# Patient Record
Sex: FEMALE | Race: ASIAN | NOT HISPANIC OR LATINO | URBAN - METROPOLITAN AREA
[De-identification: names, ages, dates, MRNs, and addresses within clinical notes are randomized per-mention and may not be internally consistent; named-entity substitution may affect disease eponyms.]

---

## 2018-01-06 ENCOUNTER — EMERGENCY (EMERGENCY)
Facility: HOSPITAL | Age: 53
LOS: 1 days | Discharge: HOME | End: 2018-01-06
Admitting: EMERGENCY MEDICINE
Payer: COMMERCIAL

## 2018-01-06 VITALS
WEIGHT: 147.93 LBS | OXYGEN SATURATION: 98 % | SYSTOLIC BLOOD PRESSURE: 128 MMHG | DIASTOLIC BLOOD PRESSURE: 72 MMHG | HEART RATE: 100 BPM | RESPIRATION RATE: 18 BRPM | TEMPERATURE: 97 F
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
## 2018-01-06 DIAGNOSIS — Z23 ENCOUNTER FOR IMMUNIZATION: ICD-10-CM

## 2018-01-06 DIAGNOSIS — S61.412A LACERATION WITHOUT FOREIGN BODY OF LEFT HAND, INITIAL ENCOUNTER: ICD-10-CM
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
## 2018-01-06 PROCEDURE — 90715 TDAP VACCINE 7 YRS/> IM: CPT

## 2018-01-06 PROCEDURE — 90471 IMMUNIZATION ADMIN: CPT

## 2018-01-06 PROCEDURE — 12001 RPR S/N/AX/GEN/TRNK 2.5CM/<: CPT

## 2018-01-06 PROCEDURE — 99283 EMERGENCY DEPT VISIT LOW MDM: CPT | Mod: 25

## 2018-01-06 PROCEDURE — 99284 EMERGENCY DEPT VISIT MOD MDM: CPT | Mod: 25

## 2018-01-06 RX ORDER — TETANUS TOXOID, REDUCED DIPHTHERIA TOXOID AND ACELLULAR PERTUSSIS VACCINE, ADSORBED 5; 2.5; 8; 8; 2.5 [IU]/.5ML; [IU]/.5ML; UG/.5ML; UG/.5ML; UG/.5ML
0.5 SUSPENSION INTRAMUSCULAR ONCE
Qty: 0 | Refills: 0 | Status: COMPLETED | OUTPATIENT
Start: 2018-01-06 | End: 2018-01-06

## 2018-01-06 RX ADMIN — TETANUS TOXOID, REDUCED DIPHTHERIA TOXOID AND ACELLULAR PERTUSSIS VACCINE, ADSORBED 0.5 MILLILITER(S): 5; 2.5; 8; 8; 2.5 SUSPENSION INTRAMUSCULAR at 16:05

## 2018-01-06 NOTE — ED PROVIDER NOTE - MEDICAL DECISION MAKING DETAILS
51 y/o f presents with laceration at base of left 1st phalanx; pt with numbness to that side of finger, likely nerve injury, has FROM, no tendon involvement.  Discussed case with hand surgery Dr. Diaz, will close in ED, f/u in office this week for further evaluation.  Tetanus updated in ED.

## 2018-01-06 NOTE — ED PROVIDER NOTE - MUSCULOSKELETAL, MLM
laceration at base of 1st phalanx of left hand in webspace between 1st and 2nd digits, +FROM 1st phalanx, decreased sensation to medial aspect

## 2018-01-06 NOTE — ED ADULT NURSE NOTE - OBJECTIVE STATEMENT
+lac noted to right hand between thumb and index finger.  states she cut herself with knife while cooking.  bleeding controlled with pressure at this time. denies numbness/tingling. able to move all fingers

## 2018-01-06 NOTE — ED PROVIDER NOTE - OBJECTIVE STATEMENT
53 y/o f presents stating she cut her left hand at base of her thumb today accidentally with a knife.  Pt reporting decreased sensation to side of her thumb.  Denies weakness, all other ROS negative.

## 2023-11-30 ENCOUNTER — APPOINTMENT (OUTPATIENT)
Dept: OTOLARYNGOLOGY | Facility: CLINIC | Age: 58
End: 2023-11-30
Payer: COMMERCIAL

## 2023-11-30 VITALS — BODY MASS INDEX: 23.05 KG/M2 | WEIGHT: 135 LBS | HEIGHT: 64 IN

## 2023-11-30 DIAGNOSIS — D22.9 MELANOCYTIC NEVI, UNSPECIFIED: ICD-10-CM

## 2023-11-30 DIAGNOSIS — R05.9 COUGH, UNSPECIFIED: ICD-10-CM

## 2023-11-30 DIAGNOSIS — J34.89 OTHER SPECIFIED DISORDERS OF NOSE AND NASAL SINUSES: ICD-10-CM

## 2023-11-30 DIAGNOSIS — K21.9 GASTRO-ESOPHAGEAL REFLUX DISEASE W/OUT ESOPHAGITIS: ICD-10-CM

## 2023-11-30 DIAGNOSIS — Z78.9 OTHER SPECIFIED HEALTH STATUS: ICD-10-CM

## 2023-11-30 PROBLEM — Z00.00 ENCOUNTER FOR PREVENTIVE HEALTH EXAMINATION: Status: ACTIVE | Noted: 2023-11-30

## 2023-11-30 PROCEDURE — 31231 NASAL ENDOSCOPY DX: CPT

## 2023-11-30 PROCEDURE — 99204 OFFICE O/P NEW MOD 45 MIN: CPT | Mod: 25

## 2023-11-30 RX ORDER — AZELASTINE HYDROCHLORIDE 137 UG/1
0.1 SPRAY, METERED NASAL TWICE DAILY
Qty: 1 | Refills: 3 | Status: ACTIVE | COMMUNITY
Start: 2023-11-30 | End: 1900-01-01

## 2023-11-30 RX ORDER — FAMOTIDINE 40 MG/1
40 TABLET, FILM COATED ORAL
Qty: 90 | Refills: 3 | Status: ACTIVE | COMMUNITY
Start: 2023-11-30 | End: 1900-01-01

## 2023-11-30 RX ORDER — FLUTICASONE PROPIONATE 50 UG/1
50 SPRAY, METERED NASAL TWICE DAILY
Qty: 1 | Refills: 3 | Status: ACTIVE | COMMUNITY
Start: 2023-11-30 | End: 1900-01-01

## 2023-12-01 PROBLEM — D22.9 NEVUS: Status: ACTIVE | Noted: 2023-12-01

## 2023-12-04 ENCOUNTER — APPOINTMENT (OUTPATIENT)
Dept: OTOLARYNGOLOGY | Facility: CLINIC | Age: 58
End: 2023-12-04

## 2024-04-04 ENCOUNTER — APPOINTMENT (OUTPATIENT)
Dept: OTOLARYNGOLOGY | Facility: CLINIC | Age: 59
End: 2024-04-04
Payer: COMMERCIAL

## 2024-04-04 DIAGNOSIS — R09.81 NASAL CONGESTION: ICD-10-CM

## 2024-04-04 PROCEDURE — 31231 NASAL ENDOSCOPY DX: CPT

## 2024-04-04 PROCEDURE — 99214 OFFICE O/P EST MOD 30 MIN: CPT | Mod: 25

## 2024-04-04 NOTE — HISTORY OF PRESENT ILLNESS
[de-identified] : CC: rhinorrhea   HISTORY OF PRESENT ILLNESS: Ms. Alex is a pleasant 58 year old lady with rhinorrhea acute episode has been present for 5 weeks but has been a recurrent issue clear rhinorrhea bilaterally, nasal congestion, and suspect she has PND leading to cough has not tried any nasal sprays of note she has a significant right nasaolabial fold nevus she admits to having a history of GERD but does not recall the medication she takes    5 mo f/u pt reports going to [place] and falling down 10 flights of stairs. while there she went to the ER and she had stitches, but they did not tell her that she had a fracture or get any imaging. she was placed on some splints for her wrist and her ankle she is still experiencing intermittent HA, pain on the R side of her face  REVIEW OF SYSTEMS: General ROS: negative for - chills, fatigue or fever Psychological ROS: negative for - anxiety or depression Ophthalmic ROS: negative for - blurry vision, decreased vision or double vision ENT ROS: negative except as noted from HPI Allergy and Immunology ROS: negative except as noted from HPI Hematological and Lymphatic ROS: negative for - bleeding problems Endocrine ROS: negative for - malaise/lethargy Respiratory ROS: negative for - stridor Cardiovascular ROS: negative for - chest pain Gastrointestinal ROS: negative for - appetite loss or nausea/vomiting Genitourinary ROS: negative for - incontinence Musculoskeletal ROS: negative for - gait disturbance Neurological ROS: negative for - behavioral changes Dermatological ROS: negative for - nail changes   Physical Exam:   GENERAL APPEARANCE: Well-developed and No Acute Distress. COMMUNICATION: Able to Communicate. Strong Voice.   HEAD AND FACE Eyes: Testing of ocular motility including primary gaze alignment normal. Inspection and Appearance: No evidence of lesions or masses Palpation: Palpation of the face reveals no sinus tenderness Salivary Glands: Symmetric without masses Facial Strength: Symmetric without evidence of facial paralysis   EAR, NOSE, MOUTH, and THROAT: Ear Canals and Tympanic Membranes, Bilateral: No evidence of inflammation or lesions. Thresholds: Clinical speech reception thresholds normal. External, Nose and Auricle: No lesions or masses.   NECK: Evaluation: No evidence of masses or crepitus. The neck is symmetric and the trachea is in the midline position. Thyroid: No evidence of enlargement, tenderness or mass. Neck Lymph Nodes: WNL. Respiratory: Inspection of the chest including symmetry, expansion and/or assessment of respiratory effort normal. Cardiovascular: Evaluation of peripheral vascular system by observation and palpation of capillary refill, normal. Neurological/Psychiatric: Alert, Oriented, Mood, and Affect Normal.   PROCEDURE: Nasal endoscopy (20164)   SURGEON: Victorino Gomez MD   Prior to the procedure, I had a discussion with the patient regarding the risks, benefits, and alternatives of the procedure and a verbal consent was obtained.   After obtaining adequate decongestion of the nasal mucosa with topical Epinephrine and adequate anesthesia with topical Lidocaine nasal spray, the nasal endoscope was used to examine the nasal passages and paranasal sinuses. The endoscope was passed along the floor of the nose bilaterally to evaluate the inferior meatus, floor of the nose, inferior turbinate, and nasopharynx. The scope was then passed superiorly to evaluate the area of the sphenoethmoidal recess, superior turbinate and superior meatus. As the scope was withdrawn anteriorly, the middle turbinate and middle meatus were carefully inspected. The endoscope was withdrawn and the patient tolerated the procedure well. No complications were encountered.   INSTRUMENTS: rigid zero   EXAM FINDINGS:  improved edema. no rhinorrhea. airway patent. externally there appears to be some depression of the left forehead possible depressed fracture  IMPRESSION: Ms. Alex is a pleasant 58 year old lady with chronic rhinitis, PND, GERD, now s/p head trauma likely depressed anterior table fracture   PLAN: -continue flonase/astelin -CT sinus -consider neurology referral possible post concussion syndrome and pain   Victorino Gomez MD [facility] Rhinology and Skull Base Surgery Department of Otolaryngology- Head and Neck Surgery [facility]

## 2024-04-04 NOTE — HISTORY OF PRESENT ILLNESS
[de-identified] : CC: rhinorrhea   HISTORY OF PRESENT ILLNESS: Ms. Alex is a pleasant 58 year old lady with rhinorrhea acute episode has been present for 5 weeks but has been a recurrent issue clear rhinorrhea bilaterally, nasal congestion, and suspect she has PND leading to cough has not tried any nasal sprays of note she has a significant right nasaolabial fold nevus she admits to having a history of GERD but does not recall the medication she takes    5 mo f/u pt reports going to [place] and falling down 10 flights of stairs. while there she went to the ER and she had stitches, but they did not tell her that she had a fracture or get any imaging. she was placed on some splints for her wrist and her ankle she is still experiencing intermittent HA, pain on the R side of her face  REVIEW OF SYSTEMS: General ROS: negative for - chills, fatigue or fever Psychological ROS: negative for - anxiety or depression Ophthalmic ROS: negative for - blurry vision, decreased vision or double vision ENT ROS: negative except as noted from HPI Allergy and Immunology ROS: negative except as noted from HPI Hematological and Lymphatic ROS: negative for - bleeding problems Endocrine ROS: negative for - malaise/lethargy Respiratory ROS: negative for - stridor Cardiovascular ROS: negative for - chest pain Gastrointestinal ROS: negative for - appetite loss or nausea/vomiting Genitourinary ROS: negative for - incontinence Musculoskeletal ROS: negative for - gait disturbance Neurological ROS: negative for - behavioral changes Dermatological ROS: negative for - nail changes   Physical Exam:   GENERAL APPEARANCE: Well-developed and No Acute Distress. COMMUNICATION: Able to Communicate. Strong Voice.   HEAD AND FACE Eyes: Testing of ocular motility including primary gaze alignment normal. Inspection and Appearance: No evidence of lesions or masses Palpation: Palpation of the face reveals no sinus tenderness Salivary Glands: Symmetric without masses Facial Strength: Symmetric without evidence of facial paralysis   EAR, NOSE, MOUTH, and THROAT: Ear Canals and Tympanic Membranes, Bilateral: No evidence of inflammation or lesions. Thresholds: Clinical speech reception thresholds normal. External, Nose and Auricle: No lesions or masses.   NECK: Evaluation: No evidence of masses or crepitus. The neck is symmetric and the trachea is in the midline position. Thyroid: No evidence of enlargement, tenderness or mass. Neck Lymph Nodes: WNL. Respiratory: Inspection of the chest including symmetry, expansion and/or assessment of respiratory effort normal. Cardiovascular: Evaluation of peripheral vascular system by observation and palpation of capillary refill, normal. Neurological/Psychiatric: Alert, Oriented, Mood, and Affect Normal.   PROCEDURE: Nasal endoscopy (59178)   SURGEON: Victorino Gomez MD   Prior to the procedure, I had a discussion with the patient regarding the risks, benefits, and alternatives of the procedure and a verbal consent was obtained.   After obtaining adequate decongestion of the nasal mucosa with topical Epinephrine and adequate anesthesia with topical Lidocaine nasal spray, the nasal endoscope was used to examine the nasal passages and paranasal sinuses. The endoscope was passed along the floor of the nose bilaterally to evaluate the inferior meatus, floor of the nose, inferior turbinate, and nasopharynx. The scope was then passed superiorly to evaluate the area of the sphenoethmoidal recess, superior turbinate and superior meatus. As the scope was withdrawn anteriorly, the middle turbinate and middle meatus were carefully inspected. The endoscope was withdrawn and the patient tolerated the procedure well. No complications were encountered.   INSTRUMENTS: rigid zero   EXAM FINDINGS:  improved edema. no rhinorrhea. airway patent. externally there appears to be some depression of the left forehead possible depressed fracture  IMPRESSION: Ms. Alex is a pleasant 58 year old lady with chronic rhinitis, PND, GERD, now s/p head trauma likely depressed anterior table fracture   PLAN: -continue flonase/astelin -CT sinus -consider neurology referral possible post concussion syndrome and pain   Victorino Gomez MD [facility] Rhinology and Skull Base Surgery Department of Otolaryngology- Head and Neck Surgery [facility]

## 2024-04-11 ENCOUNTER — APPOINTMENT (OUTPATIENT)
Dept: OTOLARYNGOLOGY | Facility: CLINIC | Age: 59
End: 2024-04-11
Payer: COMMERCIAL

## 2024-04-11 DIAGNOSIS — S09.90XA UNSPECIFIED INJURY OF HEAD, INITIAL ENCOUNTER: ICD-10-CM

## 2024-04-11 PROCEDURE — 99441: CPT

## 2024-04-11 NOTE — HISTORY OF PRESENT ILLNESS
[de-identified] : CC: rhinorrhea   HISTORY OF PRESENT ILLNESS: Ms. Alex is a pleasant 58 year old lady with rhinorrhea acute episode has been present for 5 weeks but has been a recurrent issue clear rhinorrhea bilaterally, nasal congestion, and suspect she has PND leading to cough has not tried any nasal sprays of note she has a significant right nasaolabial fold nevus she admits to having a history of GERD but does not recall the medication she takes    5 mo f/u pt reports going to [place] and falling down 10 flights of stairs. while there she went to the ER and she had stitches, but they did not tell her that she had a fracture or get any imaging. she was placed on some splints for her wrist and her ankle she is still experiencing intermittent HA, pain on the R side of her face  preCT follow up she had some question regarding the CT, which covers the entire face  REVIEW OF SYSTEMS: General ROS: negative for - chills, fatigue or fever Psychological ROS: negative for - anxiety or depression Ophthalmic ROS: negative for - blurry vision, decreased vision or double vision ENT ROS: negative except as noted from HPI Allergy and Immunology ROS: negative except as noted from HPI Hematological and Lymphatic ROS: negative for - bleeding problems Endocrine ROS: negative for - malaise/lethargy Respiratory ROS: negative for - stridor Cardiovascular ROS: negative for - chest pain Gastrointestinal ROS: negative for - appetite loss or nausea/vomiting Genitourinary ROS: negative for - incontinence Musculoskeletal ROS: negative for - gait disturbance Neurological ROS: negative for - behavioral changes Dermatological ROS: negative for - nail changes  IMPRESSION: Ms. Alex is a pleasant 58 year old lady with chronic rhinitis, PND, GERD, now s/p head trauma likely depressed anterior table fracture   PLAN: -continue flonase/astelin -CT sinus -consider neurology referral possible post concussion syndrome and pain   Victorino Gomez MD [facility] Rhinology and Skull Base Surgery Department of Otolaryngology- Head and Neck Surgery [facility]

## 2024-04-12 ENCOUNTER — APPOINTMENT (OUTPATIENT)
Dept: CT IMAGING | Facility: HOSPITAL | Age: 59
End: 2024-04-12

## 2024-04-12 ENCOUNTER — OUTPATIENT (OUTPATIENT)
Dept: OUTPATIENT SERVICES | Facility: HOSPITAL | Age: 59
LOS: 1 days | End: 2024-04-12
Payer: COMMERCIAL

## 2024-04-12 PROCEDURE — 70486 CT MAXILLOFACIAL W/O DYE: CPT | Mod: 26

## 2024-04-12 PROCEDURE — 70486 CT MAXILLOFACIAL W/O DYE: CPT

## 2024-04-18 ENCOUNTER — APPOINTMENT (OUTPATIENT)
Dept: PLASTIC SURGERY | Facility: CLINIC | Age: 59
End: 2024-04-18

## 2024-04-18 PROCEDURE — 99203 OFFICE O/P NEW LOW 30 MIN: CPT

## 2024-04-18 NOTE — HISTORY OF PRESENT ILLNESS
[FreeTextEntry1] : 59 years old patient present in the office for a facial fracture evaluation, four months ago while in vacation patient fell from staircase hit her right side of face on concreate when tto ER and had stitches and steri-strips, now patient complain of pain and sensitivity in the area, a bump is noted by the end of her eyebrow as well as a little indentation. CT done recently.  CT images and report reviewed: Findings: Soft tissues: Left frontal scalp indurated region likely corresponds to known contusive injury. There is no radiopaque foreign body or gas collection. Frontal bone is intact. There is no fracture. No focal bony lesion. Orbital walls: Intact without fracture. Mandible/TMJ: Intact without fracture or dislocation. Zygomatic arches: Intact Skull base: Intact Nasal bones: No fracture Sinuses: There is mild scattered mucosal thickening. No fluid level or wall fracture. Orbital contents: No intraorbital hemorrhage or mass effect on the globes. Lenses are in situ. Visualized Intracranial Contents: No acute hemorrhage. Low-dose inspection is grossly unremarkable. IMPRESSION: No fracture, intraorbital hemorrhage or radiopaque foreign body.

## 2024-07-08 ENCOUNTER — NON-APPOINTMENT (OUTPATIENT)
Age: 59
End: 2024-07-08

## 2024-07-09 ENCOUNTER — APPOINTMENT (OUTPATIENT)
Dept: INTERNAL MEDICINE | Facility: CLINIC | Age: 59
End: 2024-07-09
Payer: COMMERCIAL

## 2024-07-09 VITALS
TEMPERATURE: 97.1 F | BODY MASS INDEX: 23.05 KG/M2 | SYSTOLIC BLOOD PRESSURE: 101 MMHG | DIASTOLIC BLOOD PRESSURE: 69 MMHG | HEART RATE: 88 BPM | WEIGHT: 135 LBS | OXYGEN SATURATION: 97 % | HEIGHT: 64 IN

## 2024-07-09 DIAGNOSIS — Z11.59 ENCOUNTER FOR SCREENING FOR OTHER VIRAL DISEASES: ICD-10-CM

## 2024-07-09 DIAGNOSIS — R73.03 PREDIABETES.: ICD-10-CM

## 2024-07-09 DIAGNOSIS — Z78.9 OTHER SPECIFIED HEALTH STATUS: ICD-10-CM

## 2024-07-09 DIAGNOSIS — M54.50 LOW BACK PAIN, UNSPECIFIED: ICD-10-CM

## 2024-07-09 DIAGNOSIS — L20.9 ATOPIC DERMATITIS, UNSPECIFIED: ICD-10-CM

## 2024-07-09 DIAGNOSIS — Z00.01 ENCOUNTER FOR GENERAL ADULT MEDICAL EXAMINATION WITH ABNORMAL FINDINGS: ICD-10-CM

## 2024-07-09 DIAGNOSIS — R25.2 CRAMP AND SPASM: ICD-10-CM

## 2024-07-09 DIAGNOSIS — E78.5 HYPERLIPIDEMIA, UNSPECIFIED: ICD-10-CM

## 2024-07-09 DIAGNOSIS — M79.2 NEURALGIA AND NEURITIS, UNSPECIFIED: ICD-10-CM

## 2024-07-09 DIAGNOSIS — G89.29 LOW BACK PAIN, UNSPECIFIED: ICD-10-CM

## 2024-07-09 PROCEDURE — 36415 COLL VENOUS BLD VENIPUNCTURE: CPT

## 2024-07-09 PROCEDURE — G0444 DEPRESSION SCREEN ANNUAL: CPT | Mod: 59

## 2024-07-09 PROCEDURE — 99203 OFFICE O/P NEW LOW 30 MIN: CPT | Mod: 25

## 2024-07-09 PROCEDURE — 99386 PREV VISIT NEW AGE 40-64: CPT

## 2024-07-09 RX ORDER — BETAMETHASONE DIPROPIONATE 0.5 MG/G
0.05 OINTMENT TOPICAL DAILY
Qty: 1 | Refills: 0 | Status: ACTIVE | COMMUNITY
Start: 2024-07-09 | End: 1900-01-01

## 2024-07-09 RX ORDER — UBIDECARENONE/VIT E ACET 100MG-5
50 MCG CAPSULE ORAL
Refills: 0 | Status: ACTIVE | COMMUNITY
Start: 2024-07-09

## 2024-07-19 RX ORDER — NAPROXEN 500 MG/1
500 TABLET ORAL
Qty: 10 | Refills: 0 | Status: ACTIVE | COMMUNITY
Start: 2024-07-19 | End: 1900-01-01